# Patient Record
Sex: FEMALE | Race: WHITE
[De-identification: names, ages, dates, MRNs, and addresses within clinical notes are randomized per-mention and may not be internally consistent; named-entity substitution may affect disease eponyms.]

---

## 2022-06-22 ENCOUNTER — HOSPITAL ENCOUNTER (EMERGENCY)
Dept: HOSPITAL 26 - MED | Age: 22
Discharge: HOME | End: 2022-06-22
Payer: COMMERCIAL

## 2022-06-22 VITALS — SYSTOLIC BLOOD PRESSURE: 127 MMHG | DIASTOLIC BLOOD PRESSURE: 58 MMHG

## 2022-06-22 VITALS — HEIGHT: 62 IN | WEIGHT: 232 LBS | BODY MASS INDEX: 42.69 KG/M2

## 2022-06-22 DIAGNOSIS — K80.20: Primary | ICD-10-CM

## 2022-06-22 DIAGNOSIS — R11.0: ICD-10-CM

## 2022-06-22 LAB
ALBUMIN FLD-MCNC: 4 G/DL (ref 3.4–5)
AMORPH SED URNS QL MICRO: (no result) /HPF
ANION GAP SERPL CALCULATED.3IONS-SCNC: 9.5 MMOL/L (ref 8–16)
APPEARANCE UR: (no result)
AST SERPL-CCNC: 57 U/L (ref 15–37)
BASOPHILS # BLD AUTO: 0 K/UL (ref 0–0.22)
BASOPHILS NFR BLD AUTO: 0.2 % (ref 0–2)
BILIRUB SERPL-MCNC: 0.6 MG/DL (ref 0–1)
BILIRUB UR QL STRIP: NEGATIVE
BUN SERPL-MCNC: 16 MG/DL (ref 7–18)
CHLORIDE SERPL-SCNC: 106 MMOL/L (ref 98–107)
CO2 SERPL-SCNC: 26.2 MMOL/L (ref 21–32)
COLOR UR: YELLOW
CREAT SERPL-MCNC: 0.7 MG/DL (ref 0.6–1.3)
EOSINOPHIL # BLD AUTO: 0.6 K/UL (ref 0–0.4)
EOSINOPHIL NFR BLD AUTO: 6.8 % (ref 0–4)
ERYTHROCYTE [DISTWIDTH] IN BLOOD BY AUTOMATED COUNT: 13.5 % (ref 11.6–13.7)
GFR SERPL CREATININE-BSD FRML MDRD: 132 ML/MIN (ref 90–?)
GLUCOSE SERPL-MCNC: 97 MG/DL (ref 74–106)
GLUCOSE UR STRIP-MCNC: NEGATIVE MG/DL
HCT VFR BLD AUTO: 35.8 % (ref 36–48)
HGB BLD-MCNC: 12 G/DL (ref 12–16)
HGB UR QL STRIP: (no result)
LEUKOCYTE ESTERASE UR QL STRIP: (no result)
LIPASE SERPL-CCNC: 100 U/L (ref 73–393)
LYMPHOCYTES # BLD AUTO: 2.2 K/UL (ref 2.5–16.5)
LYMPHOCYTES NFR BLD AUTO: 27.4 % (ref 20.5–51.1)
MCH RBC QN AUTO: 30 PG (ref 27–31)
MCHC RBC AUTO-ENTMCNC: 33 G/DL (ref 33–37)
MCV RBC AUTO: 89.5 FL (ref 80–94)
MONOCYTES # BLD AUTO: 0.6 K/UL (ref 0.8–1)
MONOCYTES NFR BLD AUTO: 6.9 % (ref 1.7–9.3)
NEUTROPHILS # BLD AUTO: 4.8 K/UL (ref 1.8–7.7)
NEUTROPHILS NFR BLD AUTO: 58.7 % (ref 42.2–75.2)
NITRITE UR QL STRIP: NEGATIVE
PH UR STRIP: 6 [PH] (ref 5–9)
PLATELET # BLD AUTO: 414 K/UL (ref 140–450)
POTASSIUM SERPL-SCNC: 3.7 MMOL/L (ref 3.5–5.1)
RBC # BLD AUTO: 4.01 MIL/UL (ref 4.2–5.4)
RBC #/AREA URNS HPF: (no result) /HPF (ref 0–5)
SODIUM SERPL-SCNC: 138 MMOL/L (ref 136–145)
WBC # BLD AUTO: 8.2 K/UL (ref 4.8–10.8)
WBC,URINE: (no result) /HPF (ref 0–5)

## 2022-06-22 PROCEDURE — 96375 TX/PRO/DX INJ NEW DRUG ADDON: CPT

## 2022-06-22 PROCEDURE — 81001 URINALYSIS AUTO W/SCOPE: CPT

## 2022-06-22 PROCEDURE — 83690 ASSAY OF LIPASE: CPT

## 2022-06-22 PROCEDURE — 36415 COLL VENOUS BLD VENIPUNCTURE: CPT

## 2022-06-22 PROCEDURE — 96361 HYDRATE IV INFUSION ADD-ON: CPT

## 2022-06-22 PROCEDURE — 85025 COMPLETE CBC W/AUTO DIFF WBC: CPT

## 2022-06-22 PROCEDURE — 81025 URINE PREGNANCY TEST: CPT

## 2022-06-22 PROCEDURE — 99284 EMERGENCY DEPT VISIT MOD MDM: CPT

## 2022-06-22 PROCEDURE — 76705 ECHO EXAM OF ABDOMEN: CPT

## 2022-06-22 PROCEDURE — 80053 COMPREHEN METABOLIC PANEL: CPT

## 2022-06-22 PROCEDURE — 96374 THER/PROPH/DIAG INJ IV PUSH: CPT

## 2022-06-22 NOTE — NUR
Patient discharged with v/s stable. Written and verbal after care instructions 
ABOUT CHOLELITHIASIS given and explained. 

Patient alert, oriented and verbalized understanding of instructions. 
Ambulatory with steady gait. All questions addressed prior to discharge. ID 
band removed. Patient advised to follow up with PMD. Rx of ZOFRAN, MOTRIN, AND 
MAALOX ADVANCED TAB CHEW given. Patient educated on indication of medication 
including possible reaction and side effects. Opportunity to ask questions 
provided and answered.

## 2022-06-22 NOTE — NUR
21 Y/O FEMALE C/O EPIGASTRIC PAIN XTODAY. PT HAS HX OF GALLSTONES X3 YRS AND 
REPORTS THE PAIN FEELS THE SAME. PT REPORTS PAIN COMES AND GOES. +NAUSEA. 
DENIES VOMITING. PT DENIES DYSURIA. DENIES TAKING ANYTHING FOR PAIN. ABD IS 
ROUND, SOFT, AND TENDER ON PALPATION. PT REPORTS 10/10 PAIN THAT SHE DESCRIBES 
AS BURNING AND NONRADIATING. PT A/O X4 WITH EVEN AND UNLABORED RESPIRATIONS. 



PMH:GALLSTONES

NKDA

## 2022-06-22 NOTE — NUR
IV removed, catheter intact and site benign.  Applied folded 4x4 gauze and tape 
to stop bleeding. resp very labored on home O2 SaO2 91% pt to triage     Arlyn Estrella RN  09/10/19 [id number]

## 2022-10-16 ENCOUNTER — HOSPITAL ENCOUNTER (EMERGENCY)
Dept: HOSPITAL 26 - MED | Age: 22
Discharge: HOME | End: 2022-10-16
Payer: COMMERCIAL

## 2022-10-16 VITALS — SYSTOLIC BLOOD PRESSURE: 128 MMHG | DIASTOLIC BLOOD PRESSURE: 67 MMHG

## 2022-10-16 VITALS — WEIGHT: 254 LBS | HEIGHT: 63 IN | BODY MASS INDEX: 45 KG/M2

## 2022-10-16 DIAGNOSIS — R10.13: ICD-10-CM

## 2022-10-16 DIAGNOSIS — K21.9: Primary | ICD-10-CM

## 2022-10-16 DIAGNOSIS — Z79.899: ICD-10-CM

## 2022-10-16 NOTE — NUR
22YR OLD FEMALE BIB SELF C/O ABD PAIN AND BACK PAIN.  PAIN IS SHARP A 10/10 
PAIN LEVEL.  PT STATES BEING NAUSEOUS AND VOMITING TODAY.  PT IS A&OX4 RESP 
EVEN AND UNLABORED.  HX OF GALLSTONES.  HOB ELEVATED BED AT LOWEST POSITION





NKDA

GALLSTONES

## 2023-03-31 ENCOUNTER — HOSPITAL ENCOUNTER (EMERGENCY)
Dept: HOSPITAL 26 - MED | Age: 23
Discharge: LEFT BEFORE DISCHARGE | End: 2023-03-31
Payer: COMMERCIAL

## 2023-03-31 DIAGNOSIS — Z53.21: ICD-10-CM

## 2023-03-31 DIAGNOSIS — R10.9: Primary | ICD-10-CM

## 2023-03-31 NOTE — NUR
PATIENT CALL TO TRIAGE NO RESPONSE

PATIENT LEFT WITHOUT BEING SEEN BY DR. INIGUEZ. NO FURTHER CARE PROVIDED FOR 
PATIENT.

## 2023-05-08 ENCOUNTER — HOSPITAL ENCOUNTER (EMERGENCY)
Dept: HOSPITAL 26 - MED | Age: 23
Discharge: HOME | End: 2023-05-08
Payer: COMMERCIAL

## 2023-05-08 VITALS — DIASTOLIC BLOOD PRESSURE: 70 MMHG | SYSTOLIC BLOOD PRESSURE: 111 MMHG

## 2023-05-08 VITALS — BODY MASS INDEX: 44.9 KG/M2 | WEIGHT: 244 LBS | HEIGHT: 62 IN

## 2023-05-08 DIAGNOSIS — K21.9: Primary | ICD-10-CM

## 2023-05-08 DIAGNOSIS — Z79.899: ICD-10-CM

## 2023-05-08 PROCEDURE — 93005 ELECTROCARDIOGRAM TRACING: CPT

## 2023-05-08 PROCEDURE — 99283 EMERGENCY DEPT VISIT LOW MDM: CPT

## 2023-05-08 RX ADMIN — ACETAMINOPHEN ONE MG: 500 TABLET ORAL at 05:01

## 2023-05-08 RX ADMIN — LIDOCAINE HYDROCHLORIDE ONE ML: 20 SOLUTION ORAL; TOPICAL at 05:00

## 2023-05-08 NOTE — NUR
Patient discharged with v/s stable. Written and verbal after care instructions 
given and explained. New rx bismatrol, bismouth, omeprazole, sucralfate. 

Patient verbalized understanding. Ambulatory with steady gait. All questions 
addressed prior to discharge. Advised to follow up with PMD.

## 2023-06-28 ENCOUNTER — HOSPITAL ENCOUNTER (EMERGENCY)
Dept: HOSPITAL 26 - MED | Age: 23
Discharge: HOME | End: 2023-06-28
Payer: COMMERCIAL

## 2023-06-28 VITALS
OXYGEN SATURATION: 97 % | DIASTOLIC BLOOD PRESSURE: 78 MMHG | TEMPERATURE: 98 F | HEART RATE: 65 BPM | SYSTOLIC BLOOD PRESSURE: 110 MMHG | RESPIRATION RATE: 18 BRPM

## 2023-06-28 VITALS
OXYGEN SATURATION: 97 % | SYSTOLIC BLOOD PRESSURE: 111 MMHG | DIASTOLIC BLOOD PRESSURE: 79 MMHG | HEART RATE: 67 BPM | RESPIRATION RATE: 16 BRPM | TEMPERATURE: 98 F

## 2023-06-28 VITALS — BODY MASS INDEX: 45.32 KG/M2 | HEIGHT: 62 IN | WEIGHT: 246.25 LBS

## 2023-06-28 DIAGNOSIS — Z79.899: ICD-10-CM

## 2023-06-28 DIAGNOSIS — K80.20: Primary | ICD-10-CM

## 2023-06-28 DIAGNOSIS — K21.9: ICD-10-CM

## 2023-06-28 LAB
ALBUMIN FLD-MCNC: 3.9 G/DL (ref 3.4–5)
ANION GAP SERPL CALCULATED.3IONS-SCNC: 13.9 MMOL/L (ref 8–16)
APPEARANCE UR: CLEAR
AST SERPL-CCNC: 96 U/L (ref 15–37)
BASOPHILS # BLD AUTO: 0.1 K/UL (ref 0–0.22)
BASOPHILS NFR BLD AUTO: 0.7 % (ref 0–2)
BILIRUB SERPL-MCNC: 0.6 MG/DL (ref 0–1)
BILIRUB UR QL STRIP: NEGATIVE
BUN SERPL-MCNC: 17 MG/DL (ref 7–18)
CHLORIDE SERPL-SCNC: 104 MMOL/L (ref 98–107)
CO2 SERPL-SCNC: 26.6 MMOL/L (ref 21–32)
COLOR UR: YELLOW
CREAT SERPL-MCNC: 0.7 MG/DL (ref 0.6–1.3)
EOSINOPHIL # BLD AUTO: 0.5 K/UL (ref 0–0.4)
EOSINOPHIL NFR BLD AUTO: 4.5 % (ref 0–4)
ERYTHROCYTE [DISTWIDTH] IN BLOOD BY AUTOMATED COUNT: 13 % (ref 11.6–13.7)
GFR SERPL CREATININE-BSD FRML MDRD: 131 ML/MIN (ref 90–?)
GLUCOSE SERPL-MCNC: 94 MG/DL (ref 74–106)
GLUCOSE UR STRIP-MCNC: NEGATIVE MG/DL
HCT VFR BLD AUTO: 38.8 % (ref 36–48)
HGB BLD-MCNC: 12.9 G/DL (ref 12–16)
HGB UR QL STRIP: (no result)
LEUKOCYTE ESTERASE UR QL STRIP: (no result)
LIPASE SERPL-CCNC: 104 U/L (ref 73–393)
LYMPHOCYTES # BLD AUTO: 1.2 K/UL (ref 2.5–16.5)
LYMPHOCYTES NFR BLD AUTO: 12.1 % (ref 20.5–51.1)
MCH RBC QN AUTO: 30 PG (ref 27–31)
MCHC RBC AUTO-ENTMCNC: 33 G/DL (ref 33–37)
MCV RBC AUTO: 90 FL (ref 80–94)
MONOCYTES # BLD AUTO: 0.7 K/UL (ref 0.8–1)
MONOCYTES NFR BLD AUTO: 6.5 % (ref 1.7–9.3)
NEUTROPHILS # BLD AUTO: 7.6 K/UL (ref 1.8–7.7)
NEUTROPHILS NFR BLD AUTO: 76.2 % (ref 42.2–75.2)
NITRITE UR QL STRIP: NEGATIVE
PH UR STRIP: 6 [PH] (ref 5–9)
PLATELET # BLD AUTO: 374 K/UL (ref 140–450)
POTASSIUM SERPL-SCNC: 4.5 MMOL/L (ref 3.5–5.1)
RBC # BLD AUTO: 4.31 MIL/UL (ref 4.2–5.4)
RBC #/AREA URNS HPF: (no result) /HPF (ref 0–5)
SODIUM SERPL-SCNC: 140 MMOL/L (ref 136–145)
WBC # BLD AUTO: 10 K/UL (ref 4.8–10.8)

## 2023-06-28 PROCEDURE — 81001 URINALYSIS AUTO W/SCOPE: CPT

## 2023-06-28 PROCEDURE — 81025 URINE PREGNANCY TEST: CPT

## 2023-06-28 PROCEDURE — 96372 THER/PROPH/DIAG INJ SC/IM: CPT

## 2023-06-28 PROCEDURE — 87086 URINE CULTURE/COLONY COUNT: CPT

## 2023-06-28 PROCEDURE — 85025 COMPLETE CBC W/AUTO DIFF WBC: CPT

## 2023-06-28 PROCEDURE — 99283 EMERGENCY DEPT VISIT LOW MDM: CPT

## 2023-06-28 PROCEDURE — 83690 ASSAY OF LIPASE: CPT

## 2023-06-28 PROCEDURE — 80053 COMPREHEN METABOLIC PANEL: CPT

## 2023-06-28 PROCEDURE — 36415 COLL VENOUS BLD VENIPUNCTURE: CPT

## 2023-06-28 NOTE — NUR
Patient discharged with v/s stable. Written and verbal after care instructions 
given and explained. 

Patient alert, oriented and verbalized understanding of instructions. 
Ambulatory with steady gait. All questions addressed prior to discharge. ID 
band removed. Patient advised to follow up with PMD. Rx of hydrocodone,zofran 
given. Patient educated on indication of medication including possible reaction 
and side effects. Opportunity to ask questions provided and answered.

## 2023-07-18 ENCOUNTER — HOSPITAL ENCOUNTER (INPATIENT)
Dept: HOSPITAL 26 - MED | Age: 23
LOS: 1 days | Discharge: ANOTHER HOSPITAL | DRG: 284 | End: 2023-07-19
Attending: INTERNAL MEDICINE | Admitting: STUDENT IN AN ORGANIZED HEALTH CARE EDUCATION/TRAINING PROGRAM
Payer: COMMERCIAL

## 2023-07-18 ENCOUNTER — HOSPITAL ENCOUNTER (EMERGENCY)
Dept: HOSPITAL 26 - MED | Age: 23
Discharge: HOME | End: 2023-07-18
Payer: COMMERCIAL

## 2023-07-18 VITALS
DIASTOLIC BLOOD PRESSURE: 80 MMHG | RESPIRATION RATE: 20 BRPM | HEART RATE: 88 BPM | OXYGEN SATURATION: 98 % | TEMPERATURE: 98.7 F | SYSTOLIC BLOOD PRESSURE: 138 MMHG

## 2023-07-18 VITALS — RESPIRATION RATE: 19 BRPM | OXYGEN SATURATION: 100 %

## 2023-07-18 VITALS
OXYGEN SATURATION: 98 % | RESPIRATION RATE: 20 BRPM | SYSTOLIC BLOOD PRESSURE: 125 MMHG | TEMPERATURE: 97.6 F | DIASTOLIC BLOOD PRESSURE: 80 MMHG | HEART RATE: 61 BPM

## 2023-07-18 VITALS — WEIGHT: 246 LBS | HEIGHT: 62 IN | BODY MASS INDEX: 45.27 KG/M2

## 2023-07-18 VITALS
TEMPERATURE: 98.6 F | SYSTOLIC BLOOD PRESSURE: 123 MMHG | OXYGEN SATURATION: 99 % | DIASTOLIC BLOOD PRESSURE: 74 MMHG | HEART RATE: 63 BPM

## 2023-07-18 VITALS
SYSTOLIC BLOOD PRESSURE: 125 MMHG | HEART RATE: 61 BPM | OXYGEN SATURATION: 98 % | DIASTOLIC BLOOD PRESSURE: 80 MMHG | TEMPERATURE: 97.6 F | RESPIRATION RATE: 20 BRPM

## 2023-07-18 VITALS — HEIGHT: 62 IN | BODY MASS INDEX: 45.64 KG/M2 | WEIGHT: 248 LBS

## 2023-07-18 DIAGNOSIS — K80.20: Primary | ICD-10-CM

## 2023-07-18 DIAGNOSIS — K80.62: Primary | ICD-10-CM

## 2023-07-18 DIAGNOSIS — Z79.899: ICD-10-CM

## 2023-07-18 DIAGNOSIS — E66.01: ICD-10-CM

## 2023-07-18 LAB
ALBUMIN FLD-MCNC: 3.6 G/DL (ref 3.4–5)
ANION GAP SERPL CALCULATED.3IONS-SCNC: 10.3 MMOL/L (ref 8–16)
AST SERPL-CCNC: 217 U/L (ref 15–37)
BASOPHILS # BLD AUTO: 0.1 K/UL (ref 0–0.22)
BASOPHILS NFR BLD AUTO: 0.8 % (ref 0–2)
BILIRUB SERPL-MCNC: 2.4 MG/DL (ref 0–1)
BUN SERPL-MCNC: 14 MG/DL (ref 7–18)
CHLORIDE SERPL-SCNC: 104 MMOL/L (ref 98–107)
CO2 SERPL-SCNC: 28.5 MMOL/L (ref 21–32)
CREAT SERPL-MCNC: 0.7 MG/DL (ref 0.6–1.3)
EOSINOPHIL # BLD AUTO: 0.3 K/UL (ref 0–0.4)
EOSINOPHIL NFR BLD AUTO: 3.9 % (ref 0–4)
ERYTHROCYTE [DISTWIDTH] IN BLOOD BY AUTOMATED COUNT: 13.4 % (ref 11.6–13.7)
GFR SERPL CREATININE-BSD FRML MDRD: 131 ML/MIN (ref 90–?)
GLUCOSE SERPL-MCNC: 68 MG/DL (ref 74–106)
HCT VFR BLD AUTO: 34.7 % (ref 36–48)
HGB BLD-MCNC: 11.6 G/DL (ref 12–16)
LIPASE SERPL-CCNC: 81 U/L (ref 73–393)
LYMPHOCYTES # BLD AUTO: 1 K/UL (ref 2.5–16.5)
LYMPHOCYTES NFR BLD AUTO: 12.6 % (ref 20.5–51.1)
MCH RBC QN AUTO: 30 PG (ref 27–31)
MCHC RBC AUTO-ENTMCNC: 34 G/DL (ref 33–37)
MCV RBC AUTO: 89.5 FL (ref 80–94)
MONOCYTES # BLD AUTO: 0.7 K/UL (ref 0.8–1)
MONOCYTES NFR BLD AUTO: 9 % (ref 1.7–9.3)
NEUTROPHILS # BLD AUTO: 6 K/UL (ref 1.8–7.7)
NEUTROPHILS NFR BLD AUTO: 73.7 % (ref 42.2–75.2)
PLATELET # BLD AUTO: 330 K/UL (ref 140–450)
POTASSIUM SERPL-SCNC: 3.8 MMOL/L (ref 3.5–5.1)
RBC # BLD AUTO: 3.88 MIL/UL (ref 4.2–5.4)
SODIUM SERPL-SCNC: 139 MMOL/L (ref 136–145)
WBC # BLD AUTO: 8.1 K/UL (ref 4.8–10.8)

## 2023-07-18 PROCEDURE — 99283 EMERGENCY DEPT VISIT LOW MDM: CPT

## 2023-07-18 PROCEDURE — 81002 URINALYSIS NONAUTO W/O SCOPE: CPT

## 2023-07-18 PROCEDURE — 96372 THER/PROPH/DIAG INJ SC/IM: CPT

## 2023-07-18 PROCEDURE — 81025 URINE PREGNANCY TEST: CPT

## 2023-07-18 RX ADMIN — SODIUM CHLORIDE SCH MLS/HR: 9 INJECTION, SOLUTION INTRAVENOUS at 15:14

## 2023-07-18 NOTE — NUR
Patient discharged with v/s stable. Written and verbal after care instructions 
given and explained. New rx tylenol, mylanta and zofran.  

Patient verbalized understanding. Ambulatory with steady gait. All questions 
addressed prior to discharge. Advised to follow up with PMD.

## 2023-07-18 NOTE — NUR
Faxed the facesheet to [facility] per [facility] Supervisor at night request

## 2023-07-18 NOTE — NUR
Krystyna [facility] House Supervisor night called and she stated that for the HIDA scan, she 
recommended to call [facility] for portable HIDA scan tonight; for MRCP,  she stated that it will 
be done after 11 am tomorrow because the MRI tech will come at 11 am.

## 2023-07-18 NOTE — NUR
ADMINISTERED SCHEDULED MEDICATION PER MD ORDER. TOLERATED WELL. PROVIDED SOCKS PER PATIENT 
REQUEST. DENIES PAIN AT THIS TIME. RESPIRATIONS EVEN AND UNLABORED WITH NO APPARENT S/SX OF 
ACUTE DISTRESS. WHITE COMMUNICATION BOARD UPDATED. ALL SAFETY MEASURES IN PLACE. CALL LIGHT 
WITHIN REACH. ENCOURAGED TO CALL FOR ANY NEEDS/ASSISTANCE. BED IN LOW/LOCKED POSITION. SIDE 
RAILS X2 UP. WILL CONTINUE TO MONITOR.

## 2023-07-18 NOTE — NUR
[facility] for HIDA scan (John) called ([phone]),  and stated that the tech will 
come between 7am and 9am tomorrow morning because tonight is fully booked.

He stated that pt needs to be NPO after midnight and no pain medication after midnight.

Will let the MST nurse know

## 2023-07-18 NOTE — NUR
RECEIVED BEDSIDE REPORT FROM MAIRI RN FOR CONTINUITY OF CARE. PATIENT IS AWAKE AND STABLE. 
A&OX4. DENIES PAIN BUT C/O FEELING NAUSEATED. ENDORSED TO MARII RN FOR IV PRN PER MD ORDER. 
ON ROOM AIR WITH NO APPARENT S/SX OF ACUTE DISTRESS. RESPIRATIONS EVEN AND UNLABORED. IV 
SITE TO THE RAC 20G PATENT/INTACT WITH NS INFUSING AT 80 ML/HOUR. SKIN IS INTACT. PATIENT IS 
CONTINENT AND ABLE TO AMBULATE. PLAN OF CARE AND WHITE COMMUNICATION BOARD UPDATED. ALL 
SAFETY MEASURES IN PLACE. CALL LIGHT WITHIN REACH. ENCOURAGED TO CALL FOR ANY 
NEEDS/ASSISTANCE. BED IN LOW/LOCKED POSITION. SIDE RAILS X2 UP. WILL CONTINUE TO MONITOR.

## 2023-07-18 NOTE — NUR
RADIOLOGY CALLED AND INFORMED HIDA SCAN MIGHT BE HALTED FOR TOMORROW. GIVE PATIENT 2 MILK 
BOXES AT 0400H FOR TOMORROW'S PROCEDURE. INFORM PM NURSE TO FOLLOW UP WITH TIME FOR HIDA 
SCAN PROCEDURE TOMORROW AM.

## 2023-07-18 NOTE — NUR
PATIENT HAS BEEN SCREENED AND CATEGORIZED AS LOW NUTRITION RISK. PATIENT WILL BE SEEN WITHIN 
7 DAYS OF ADMISSION.



7/25/23



JOHANNY TURNER RD

## 2023-07-18 NOTE — NUR
Texted Nuris for HIDA scan [facility] approval;  she called back to approve for calling [facility] for 
HIDA scan tonight

## 2023-07-18 NOTE — NUR
Isaak the Stand In oncall called and stated that he and Demond paez tech talked already. 
 Isaak also stated that the HIDA scan problem is the QC and the Stand In in the morning will 
look at it again and they will call [employer] rep to fix it

## 2023-07-18 NOTE — NUR
Texted Dr Rahman regarding HIDA scan will be done tomorrow morning between 7am and 9am in 
[facility], cannot be done tonight because [facility] is fully booked tonight. 

MRCP [place]am [place] [facility] [place] tech will come at 11am 
tomorrow morning.

## 2023-07-18 NOTE — NUR
Called [facility] for portable HIDA scan, spoke with Irma  She stated that she will call the 
tech tonight

-------------------------------------------------------------------------------

Addendum: 07/18/23 at 2054 by Rosana Estrella RN

-------------------------------------------------------------------------------

[facility] phone number [phone]

## 2023-07-18 NOTE — NUR
Called [facility] Supervisor ([phone]) spoke with Manuel (House Supervisor am shift) and 
she stated that the night House Supervisor Krystyna will call back because they still give 
report

## 2023-07-18 NOTE — NUR
Patient will be admitted to care of Mohsin,MD. Admited to med surg.  Will go to 
room 107B. Belongings list completed.  Report to CRISTINA Batres.

## 2023-07-18 NOTE — NUR
PATIENT IS STABLE AND ASLEEP WITH NO FACIAL GRIMACING. CHEST IS RISING AND FALLING 
SYMMETRICALLY. RESPIRATIONS EVEN AND UNLABORED WITH NO APPARENT S/SX OF ACUTE DISTRESS. 
WHITE COMMUNICATION BOARD UPDATED. ALL SAFETY MEASURES IN PLACE. CALL LIGHT WITHIN REACH. 
BED IN LOW/LOCKED POSITION. SIDE RAILS X2 UP. WILL CONTINUE TO MONITOR.

## 2023-07-19 VITALS
HEART RATE: 65 BPM | RESPIRATION RATE: 18 BRPM | DIASTOLIC BLOOD PRESSURE: 62 MMHG | TEMPERATURE: 96.8 F | SYSTOLIC BLOOD PRESSURE: 100 MMHG | OXYGEN SATURATION: 98 %

## 2023-07-19 VITALS
SYSTOLIC BLOOD PRESSURE: 106 MMHG | DIASTOLIC BLOOD PRESSURE: 54 MMHG | HEART RATE: 63 BPM | RESPIRATION RATE: 18 BRPM | TEMPERATURE: 96.4 F

## 2023-07-19 VITALS
DIASTOLIC BLOOD PRESSURE: 55 MMHG | HEART RATE: 63 BPM | SYSTOLIC BLOOD PRESSURE: 104 MMHG | TEMPERATURE: 96.9 F | RESPIRATION RATE: 19 BRPM | OXYGEN SATURATION: 98 %

## 2023-07-19 VITALS
DIASTOLIC BLOOD PRESSURE: 54 MMHG | RESPIRATION RATE: 18 BRPM | SYSTOLIC BLOOD PRESSURE: 106 MMHG | HEART RATE: 63 BPM | TEMPERATURE: 96.8 F | OXYGEN SATURATION: 97 %

## 2023-07-19 LAB
ALBUMIN FLD-MCNC: 3.3 G/DL (ref 3.4–5)
ANION GAP SERPL CALCULATED.3IONS-SCNC: 12.4 MMOL/L (ref 8–16)
AST SERPL-CCNC: 145 U/L (ref 15–37)
BASOPHILS # BLD AUTO: 0 K/UL (ref 0–0.22)
BASOPHILS NFR BLD AUTO: 0.6 % (ref 0–2)
BILIRUB SERPL-MCNC: 0.9 MG/DL (ref 0–1)
BUN SERPL-MCNC: 11 MG/DL (ref 7–18)
CHLORIDE SERPL-SCNC: 106 MMOL/L (ref 98–107)
CO2 SERPL-SCNC: 25.4 MMOL/L (ref 21–32)
CREAT SERPL-MCNC: 0.7 MG/DL (ref 0.6–1.3)
EOSINOPHIL # BLD AUTO: 0.5 K/UL (ref 0–0.4)
EOSINOPHIL NFR BLD AUTO: 7.1 % (ref 0–4)
ERYTHROCYTE [DISTWIDTH] IN BLOOD BY AUTOMATED COUNT: 13.7 % (ref 11.6–13.7)
GFR SERPL CREATININE-BSD FRML MDRD: 131 ML/MIN (ref 90–?)
GLUCOSE SERPL-MCNC: 87 MG/DL (ref 74–106)
HCT VFR BLD AUTO: 34.5 % (ref 36–48)
HGB BLD-MCNC: 11.4 G/DL (ref 12–16)
LYMPHOCYTES # BLD AUTO: 1.4 K/UL (ref 2.5–16.5)
LYMPHOCYTES NFR BLD AUTO: 18.4 % (ref 20.5–51.1)
MAGNESIUM SERPL-MCNC: 2.2 MG/DL (ref 1.8–2.4)
MCH RBC QN AUTO: 30 PG (ref 27–31)
MCHC RBC AUTO-ENTMCNC: 33 G/DL (ref 33–37)
MCV RBC AUTO: 91.1 FL (ref 80–94)
MONOCYTES # BLD AUTO: 0.7 K/UL (ref 0.8–1)
MONOCYTES NFR BLD AUTO: 8.8 % (ref 1.7–9.3)
NEUTROPHILS # BLD AUTO: 5 K/UL (ref 1.8–7.7)
NEUTROPHILS NFR BLD AUTO: 65.1 % (ref 42.2–75.2)
PHOSPHATE SERPL-MCNC: 4.1 MG/DL (ref 2.5–4.9)
PLATELET # BLD AUTO: 324 K/UL (ref 140–450)
POTASSIUM SERPL-SCNC: 3.8 MMOL/L (ref 3.5–5.1)
RBC # BLD AUTO: 3.79 MIL/UL (ref 4.2–5.4)
SODIUM SERPL-SCNC: 140 MMOL/L (ref 136–145)
WBC # BLD AUTO: 7.7 K/UL (ref 4.8–10.8)

## 2023-07-19 RX ADMIN — SODIUM CHLORIDE SCH MLS/HR: 9 INJECTION, SOLUTION INTRAVENOUS at 03:33

## 2023-07-19 RX ADMIN — SODIUM CHLORIDE SCH MLS/HR: 9 INJECTION, SOLUTION INTRAVENOUS at 15:17

## 2023-07-19 NOTE — NUR
ASSISTED PATIENT TO THE BATHROOM. DENIES PAIN AT THIS TIME. RESPIRATIONS EVEN AND UNLABORED 
WITH NO APPARENT S/SX OF ACUTE DISTRESS. WHITE COMMUNICATION BOARD UPDATED. ALL SAFETY 
MEASURES IN PLACE. CALL LIGHT WITHIN REACH. ENCOURAGED TO CALL FOR ANY NEEDS/ASSISTANCE. BED 
IN LOW/LOCKED POSITION. SIDE RAILS X2 UP. WILL CONTINUE TO MONITOR.

## 2023-07-19 NOTE — NUR
PATIENT IS STABLE AND ASLEEP WITH NO FACIAL GRIMACING. FLACC=0. CHEST IS RISING AND FALLING 
SYMMETRICALLY. RESPIRATIONS EVEN AND UNLABORED WITH NO APPARENT S/SX OF ACUTE DISTRESS. 
WHITE COMMUNICATION BOARD UPDATED. ALL SAFETY MEASURES IN PLACE. CALL LIGHT WITHIN REACH. 
BED IN LOW/LOCKED POSITION. WILL CONTINUE TO MONITOR.

## 2023-07-19 NOTE — NUR
RECEIVE CALL FROM [facility] WHO INFORM NURSE THAT STAFF IS WORKING ON SENDING PATIENT 
TO [place] FOR HIDA SCAN AND MRI, ADDITIONALLY; DR. HENDRICKS PLAN TO HAVE PATIENT'S SURGERY IN 
[place].  NURSE NEED KEEPING PATIENT NPO & NO OPIOIDS AT THIS TIME 

-------------------------------------------------------------------------------

Addendum: 07/19/23 at 1154 by Milagros Lai RN

-------------------------------------------------------------------------------

RECEIVE CALL FROM RADIOLOGY DEPARTMENT PERSONAL CALL THAT PATIENT WILL BE  AROUND 
1530 OR 1630.  INFORMATE PATIENT.  WILL FOLLOW UP.

## 2023-07-19 NOTE — NUR
[facility] CONSULT HAS BEEN RECEIVED FOR VOMITING X 3 DAYS ON 7/19/23. PATIENT HAS BEEN RE-SCREENED 
AS HIGH RISK AND WILL BE SEEN WITHIN 1-2 DAYS OF RECEIVING THE FNS CONSULT.



JOHANNY TURNER RD

## 2023-07-19 NOTE — NUR
PT DC'D TO [facility], THE REPORT GIVEN TO THE TRANSPORTER AS WELL AS THE NURSE 
POLEN IN [facility], PT IS GOING TO ROOM 207A, ID REMOVED BUT LEFT HL IN PLACE. THE CD AND 
THE COPY OF THE CHART GIVEN, PT LEFT WITH OUT SOB AND DISCOMFORT THE UNIT IN [facility].[id number]

## 2023-07-19 NOTE — NUR
RECEIVED REPORT FROM NIGHT SHIFT NURSE, FARA, FOR CONTINUITY OF CARE. PT IN BED SLEEPING 
AT THIS TIME. RESPIRATIONS ARE EVEN AND UNLABORED ON ROOM AIR. NO SIGNS OF DISTRESS NOTED. 
NO SIGNS OF PAIN OR DISCOMFORT NOTED AT THIS TIME.

## 2023-07-19 NOTE — NUR
7/19/23 RD INITIAL ASSESSMENT COMPLETED



PLEASE REFER TO NUTRITION ASSESSMENT UNDER CARE ACTIVITY FOR ESTIMATED NUTRITIONAL NEEDS. 



1. CONTINUE NPO DIET AS TOLERATED AND ONCE MEDICALLY APPROPRIATE ADVANCE TO CLEAR LIQUID 
DIET AS TOLERATED

2. RD WILL CONTINUE TO MONITOR PO STATUS, GI ISSUES, WEIGHT, AND NUTRITION RELATED LAB 
VALUES.

3. RD ENCOURAGES PATIENT TO FOLLOW GALLBLADDER NUTRITION EDUCATION ONCE SHE LEAVES THE 
HOSPITAL. 

4. RD TO FOLLOW-UP 3-5 DAYS, MODERATE RISK 



JOHANNY TURNER RD

## 2023-07-19 NOTE — NUR
DC PLANNING

24 YO FEMALE ADMITTED TO TELEMETRY FOR PROGRESSIVELY WORSENING ABDOMINAL PAIN.PATIENT HAS 
KNOWN HX OF GALLSTONES AND HAS HAD SIMILAR SYMPTOMS ON AND OFF FOR THE PAST 4 YEARS.GB US 
SHOWS CHOLELITHIASIS.ABDOMEN/PELVIS CT SHOWS NO EVIDENCE OF ACUTE CHOLECYSTITIS AND PATCHY 
OPACITIES IN THE LOWER LOBE CONCERNING FOR PNEUMONIA.MS AND NORCO FOR PAIN MEDS. ON 
CEFTRIAXONE.

HIDA SCAN WAS REQUESTED AND SCHEDULED WITH [facility], AN OUTSIDE VENDOR BY [facility] 
BUT WAS CANCELLED BY OUR TECHNICIAN,JUAN . HIDA SCAN AND MRCP WERE  RECOMMENDED BY SITA 
TO BE DONE AT [facility] RADIOLOGY DEPARTMENT .PATIENT WILL BE TRANSFERRED TO [facility] FOR HIGH RISK 
 CHOLECYSTECTOMY PER REQUEST OF GEN.SURGERY FOR BMI OF 45.[facility], HOUSE SUPERVISOR WAS 
NOTIFIED [phone] .CLINICALS FAXED FOR BILLING PURPOSES.[facility] WAS ALSO 
NOTIFIED.CLINICALS FAXED AS WELL.WAITING FOR BED AVAILABILITY AND AUTHORIZATION FROM [place] 
AT  [facility] [phone]. CM TO FOLLOW. 

-------------------------------------------------------------------------------

Addendum: 07/19/23 at [address]26 by [address] PLATA CM

-------------------------------------------------------------------------------

DC PLANNING

AUTH # [id number] FOR [facility] PROVIDED BY [place] AT [facility] AND GIVEN TO CHANIQUE  ,HOUSE SUP AT 
[facility].TRANSPORT AUTH# [id number].BED TO BE GIVEN LATER BY ESPERANZA LOMBARDI AT [facility].   

-------------------------------------------------------------------------------

Addendum: 07/19/23 at 1629 by Jordyn Wlikinson RN

-------------------------------------------------------------------------------

DC PLANNING:

RECEIVED A CALL [facility] HOUSE SUPERVISOR SPOKE WITH DION PROVIDED THE 
ROOM NUMBER PT CAN GO 207B # TO GIVE REPORT  ARRANGED TRANSPORT WITH [facility]  
TIME 5PM NOTIFIED DIMITRY ESCALANTE CM TO FOLLOW

## 2023-07-19 NOTE — NUR
PROVIDED ICE CHIPS PER PATIENT REQUEST. CHANGED IV TUBING D/T CONSTANT BEEPING. TOLERATED 
WELL. DENIES PAIN AT THIS TIME. RESPIRATIONS EVEN AND UNLABORED WITH NO APPARENT S/SX OF 
ACUTE DISTRESS. ALL NEEDS MET AT THIS TIME. WHITE COMMUNICATION BOARD UPDATED. ALL SAFETY 
MEASURES IN PLACE. CALL LIGHT WITHIN REACH. ENCOURAGED TO CALL FOR ANY CALLS/NEEDS. BED IN 
LOW/LOCKED POSITION. SIDE RAILS X2 UP. WILL CONTINUE TO MONITOR.

## 2023-07-19 NOTE — NUR
[facility] Radiology tech called to confirm MRCP today 7/19/23 at 11am,  told him patient will be 
there at 11am for MRCP per surgeon Dr Rahman's order